# Patient Record
Sex: MALE | Race: WHITE
[De-identification: names, ages, dates, MRNs, and addresses within clinical notes are randomized per-mention and may not be internally consistent; named-entity substitution may affect disease eponyms.]

---

## 2018-05-24 ENCOUNTER — HOSPITAL ENCOUNTER (OUTPATIENT)
Dept: HOSPITAL 47 - RADUSWWP | Age: 38
Discharge: HOME | End: 2018-05-24
Attending: FAMILY MEDICINE
Payer: SELF-PAY

## 2018-05-24 VITALS — DIASTOLIC BLOOD PRESSURE: 94 MMHG | HEART RATE: 118 BPM | SYSTOLIC BLOOD PRESSURE: 175 MMHG

## 2018-05-24 VITALS — TEMPERATURE: 98.2 F | RESPIRATION RATE: 14 BRPM

## 2018-05-24 DIAGNOSIS — Z09: Primary | ICD-10-CM

## 2018-05-24 DIAGNOSIS — Z86.718: ICD-10-CM

## 2018-05-24 DIAGNOSIS — I10: ICD-10-CM

## 2018-05-24 DIAGNOSIS — R00.0: ICD-10-CM

## 2018-05-24 PROCEDURE — 84481 FREE ASSAY (FT-3): CPT

## 2018-05-24 PROCEDURE — 36591 DRAW BLOOD OFF VENOUS DEVICE: CPT

## 2018-05-24 PROCEDURE — 83835 ASSAY OF METANEPHRINES: CPT

## 2018-05-24 PROCEDURE — 99211 OFF/OP EST MAY X REQ PHY/QHP: CPT

## 2018-05-24 RX ADMIN — SODIUM CHLORIDE, PRESERVATIVE FREE PRN ML: 5 INJECTION INTRAVENOUS at 15:20

## 2018-05-24 RX ADMIN — SODIUM CHLORIDE, PRESERVATIVE FREE PRN ML: 5 INJECTION INTRAVENOUS at 14:15

## 2018-05-24 NOTE — US
EXAMINATION TYPE: US venous doppler duplex LE RT

 

DATE OF EXAM: 5/24/2018 1:06 PM

 

COMPARISON: NONE

 

CLINICAL HISTORY: I82.411.

 

SIDE PERFORMED: Right  

 

TECHNIQUE:  The lower extremity deep venous system is examined utilizing real time linear array sonog
herminia with graded compression, doppler sonography and color-flow sonography.

 

VESSELS IMAGED:

 

Common Femoral Vein

Deep Femoral Vein

Greater Saphenous Vein *

Femoral Vein

Popliteal Vein

Small Saphenous Vein *

Proximal Calf Veins

(* superficial vessels)

 

Grayscale, color doppler, spectral doppler imaging performed of the deep veins of the right lower ext
remity.  There is normal flow, compressibility, vascular waveforms.

 

Right Leg:  Negative for DVT. At patient's c/o right anterior thigh pain prominent muscle is noted co
mpared to left thigh at same level. 

 

In the patient's area of pain anteriorly within the anterior compartment of the thigh in the region o
f the vastus intermedialis there is diffuse muscular edema with abnormal echogenicity and thickness. 
Muscular striations are ill-defined.

 

IMPRESSION: 

1. No sonographic evidence of deep venous arthrosis within the right lower extremity.

2. Abnormal thickness and echogenicity of the right anterior thigh musculature in the region of the v
astus intermedialis that could represent underlying muscular strain, tear, or less likely mass creati
ng muscular edema. Further evaluation with enhanced MRI is recommended.